# Patient Record
Sex: MALE | URBAN - METROPOLITAN AREA
[De-identification: names, ages, dates, MRNs, and addresses within clinical notes are randomized per-mention and may not be internally consistent; named-entity substitution may affect disease eponyms.]

---

## 2020-06-04 ENCOUNTER — HOSPITAL ENCOUNTER (OUTPATIENT)
Age: 40
Setting detail: SPECIMEN
Discharge: HOME | End: 2020-06-04
Payer: COMMERCIAL

## 2020-06-04 LAB — MICRO: NORMAL

## 2020-06-04 PROCEDURE — 89321 SEMEN ANAL SPERM DETECTION: CPT

## 2022-12-21 ENCOUNTER — HOSPITAL ENCOUNTER (EMERGENCY)
Age: 42
Discharge: HOME | End: 2022-12-21

## 2022-12-21 VITALS
HEIGHT: 72 IN | RESPIRATION RATE: 18 BRPM | BODY MASS INDEX: 28.17 KG/M2 | WEIGHT: 208 LBS | SYSTOLIC BLOOD PRESSURE: 120 MMHG | OXYGEN SATURATION: 99 % | TEMPERATURE: 98.3 F | HEART RATE: 56 BPM | DIASTOLIC BLOOD PRESSURE: 75 MMHG

## 2022-12-21 DIAGNOSIS — R45.851 SUICIDAL IDEATION: Primary | ICD-10-CM

## 2022-12-21 LAB
ALBUMIN SERPL-MCNC: 3.9 GM/DL (ref 3.4–5)
ALCOHOL SCREEN SERUM: <0.01 %WT/VOL
ALP BLD-CCNC: 63 IU/L (ref 40–129)
ALT SERPL-CCNC: 16 U/L (ref 10–40)
ANION GAP SERPL CALCULATED.3IONS-SCNC: 9 MMOL/L (ref 4–16)
AST SERPL-CCNC: 19 IU/L (ref 15–37)
BASOPHILS ABSOLUTE: 0.1 K/CU MM
BASOPHILS RELATIVE PERCENT: 0.7 % (ref 0–1)
BILIRUB SERPL-MCNC: 0.8 MG/DL (ref 0–1)
BILIRUBIN URINE: NEGATIVE MG/DL
BLOOD, URINE: NEGATIVE
BUN BLDV-MCNC: 9 MG/DL (ref 6–23)
CALCIUM SERPL-MCNC: 8.8 MG/DL (ref 8.3–10.6)
CHLORIDE BLD-SCNC: 103 MMOL/L (ref 99–110)
CLARITY: CLEAR
CO2: 24 MMOL/L (ref 21–32)
COLOR: YELLOW
COMMENT UA: NORMAL
CREAT SERPL-MCNC: 0.7 MG/DL (ref 0.9–1.3)
DIFFERENTIAL TYPE: ABNORMAL
EOSINOPHILS ABSOLUTE: 0.3 K/CU MM
EOSINOPHILS RELATIVE PERCENT: 2.8 % (ref 0–3)
GFR SERPL CREATININE-BSD FRML MDRD: >60 ML/MIN/1.73M2
GLUCOSE BLD-MCNC: 95 MG/DL (ref 70–99)
GLUCOSE, URINE: NEGATIVE MG/DL
HCT VFR BLD CALC: 42.9 % (ref 42–52)
HEMOGLOBIN: 14.6 GM/DL (ref 13.5–18)
IMMATURE NEUTROPHIL %: 0.2 % (ref 0–0.43)
KETONES, URINE: NEGATIVE MG/DL
LEUKOCYTE ESTERASE, URINE: NEGATIVE
LYMPHOCYTES ABSOLUTE: 1.8 K/CU MM
LYMPHOCYTES RELATIVE PERCENT: 17.8 % (ref 24–44)
MCH RBC QN AUTO: 31.3 PG (ref 27–31)
MCHC RBC AUTO-ENTMCNC: 34 % (ref 32–36)
MCV RBC AUTO: 91.9 FL (ref 78–100)
MONOCYTES ABSOLUTE: 0.7 K/CU MM
MONOCYTES RELATIVE PERCENT: 6.8 % (ref 0–4)
NITRITE URINE, QUANTITATIVE: NEGATIVE
NUCLEATED RBC %: 0 %
PDW BLD-RTO: 11.8 % (ref 11.7–14.9)
PH, URINE: 7 (ref 5–8)
PLATELET # BLD: 309 K/CU MM (ref 140–440)
PMV BLD AUTO: 10 FL (ref 7.5–11.1)
POTASSIUM SERPL-SCNC: 3.9 MMOL/L (ref 3.5–5.1)
PROTEIN UA: NEGATIVE MG/DL
RBC # BLD: 4.67 M/CU MM (ref 4.6–6.2)
SEGMENTED NEUTROPHILS ABSOLUTE COUNT: 7.3 K/CU MM
SEGMENTED NEUTROPHILS RELATIVE PERCENT: 71.7 % (ref 36–66)
SODIUM BLD-SCNC: 136 MMOL/L (ref 135–145)
SPECIFIC GRAVITY UA: 1.02 (ref 1–1.03)
TOTAL IMMATURE NEUTOROPHIL: 0.02 K/CU MM
TOTAL NUCLEATED RBC: 0 K/CU MM
TOTAL PROTEIN: 6.7 GM/DL (ref 6.4–8.2)
TOTAL RETICULOCYTE COUNT: 0.07 K/CU MM
UROBILINOGEN, URINE: 1 MG/DL (ref 0.2–1)
WBC # BLD: 10.2 K/CU MM (ref 4–10.5)

## 2022-12-21 PROCEDURE — 85025 COMPLETE CBC W/AUTO DIFF WBC: CPT

## 2022-12-21 PROCEDURE — 80053 COMPREHEN METABOLIC PANEL: CPT

## 2022-12-21 PROCEDURE — 99285 EMERGENCY DEPT VISIT HI MDM: CPT

## 2022-12-21 PROCEDURE — 81003 URINALYSIS AUTO W/O SCOPE: CPT

## 2022-12-21 PROCEDURE — G0480 DRUG TEST DEF 1-7 CLASSES: HCPCS

## 2022-12-21 ASSESSMENT — PAIN - FUNCTIONAL ASSESSMENT
PAIN_FUNCTIONAL_ASSESSMENT: NONE - DENIES PAIN
PAIN_FUNCTIONAL_ASSESSMENT: NONE - DENIES PAIN

## 2022-12-21 NOTE — ED NOTES
Patient given copy of safety plan and a list of [place] [address] resources as well as emergency contact numbers.      KACEY Alva  12/21/22 1826

## 2022-12-21 NOTE — CONSULTS
Psychiatric Consult  Isaura Vasquez  [id number]  12/21/2022 12/21/22            ID: Patient is a 43 y.o. male     CC: I was stressed but better now     HPI:  Pt is a 44 yo  male who presents for exacerbation of adjustment d/O with depressed mood. Pt noted recent exacarbation of mood but feels safe on his current medications. Pt noted he currently feels safe and comfortable on the unit. Pt was in agreement with treatment team.  Pt was polite and cordial during the interview process. Pt throughout his stay at [facility] pt felt like his medications were working and  felt comfortable being discharged on these medications. Pt was advised to take all medications as prescribed, follow up with all scheduled appointments and abstain from any alcohol or illicit substances. Pt was in agreement. Pt felt safe and comfortable to be discharge and to follow up with outpt mental health. Pt was very optimistic about his D/C and returning to his home. Pt stated that he  was doing \"good,\" today. Pt stated that he slept \"about 6 hours,\" last night. Pt stated that his appetite is \"good. \"  Pt stated that he rates his depression a  \"0,\" on a scale of 0-10 with 10 being the worst and 0 being none. Pt stated  that he rates his anxiety an \"1/10,\" on the same scale. Pt denies any auditory  or visual hallucinations. Pt denied any thoughts to harm himself or anyone else. Pt felt safe and comfortable for D/C. Pt denies any access to guns or weapons. The Pt was educated primarily by verbal means about his diagnoses and their manifestations in his life. The option for treatment including group individual therapy programming was offered to him and the use of medications with all their  potential risks, benefits, and side-effects were discussed with the pt at length. Pt was given the opportunity to ask questions and he participated in the treatment and planning process.  Pt felt ready and eager to be discharged from the from [facility]. Pt felt he was safe for this disposition. Pt was considered to be able to participate in informed consent and decision-making with respect to medical,  legal and financial issues at the time of his discharge from [facility]. Pt denied any hx of seizures, TBIs, Hep C or HIV  No TD noted, AIMS=0,      Pt noted hx of previous inpt psychiatric admissions  Pt denied any previous suicide attempt   Pt denied any family hx of suicides  Pt denied any family mental health hx     Pt denied any hx of abuse trauma or neglect, physical sexual or emotional.       Alcohol: noted social use only once per month  Street drugs: denies any current  Tobacco: denies any current  Caffeine: 2-3 per day      Past Psychiatric History:   See note above       Family Psychiatric History:   History reviewed. No pertinent family history. Allergies:  No Known Allergies     OBJECTIVE  Vital Signs: There were no vitals filed for this visit.     Labs:  Recent Results (from the past 48 hour(s))   CBC with Auto Differential    Collection Time: 12/21/22  3:02 PM   Result Value Ref Range    WBC 10.2 4.0 - 10.5 K/CU MM    RBC 4.67 4.6 - 6.2 M/CU MM    Hemoglobin 14.6 13.5 - 18.0 GM/DL    Hematocrit 42.9 42 - 52 %    MCV 91.9 78 - 100 FL    MCH 31.3 (H) 27 - 31 PG    MCHC 34.0 32.0 - 36.0 %    RDW 11.8 11.7 - 14.9 %    Platelets 031 710 - 288 K/CU MM    MPV 10.0 7.5 - 11.1 FL    Differential Type AUTOMATED DIFFERENTIAL     Segs Relative 71.7 (H) 36 - 66 %    Lymphocytes % 17.8 (L) 24 - 44 %    Monocytes % 6.8 (H) 0 - 4 %    Eosinophils % 2.8 0 - 3 %    Basophils % 0.7 0 - 1 %    Segs Absolute 7.3 K/CU MM    Lymphocytes Absolute 1.8 K/CU MM    Monocytes Absolute 0.7 K/CU MM    Eosinophils Absolute 0.3 K/CU MM    Basophils Absolute 0.1 K/CU MM    Nucleated RBC % 0.0 %    Total Nucleated RBC 0.0 K/CU MM    TRC 0.0691 K/CU MM    Total Immature Neutrophil 0.02 K/CU MM    Immature Neutrophil % 0.2 0 - 0.43 %   Comprehensive Metabolic Panel w/ Reflex to [facility]    Collection Time: 12/21/22  3:02 PM   Result Value Ref Range    Sodium 136 135 - 145 MMOL/L    Potassium 3.9 3.5 - 5.1 MMOL/L    Chloride 103 99 - 110 mMol/L    CO2 24 21 - 32 MMOL/L    BUN 9 6 - 23 MG/DL    Creatinine 0.7 (L) 0.9 - 1.3 MG/DL    Est, Glom Filt Rate >60 >60 mL/min/1.73m2    Glucose 95 70 - 99 MG/DL    Calcium 8.8 8.3 - 10.6 MG/DL    Albumin 3.9 3.4 - 5.0 GM/DL    Total Protein 6.7 6.4 - 8.2 GM/DL    Total Bilirubin 0.8 0.0 - 1.0 MG/DL    ALT 16 10 - 40 U/L    AST 19 15 - 37 IU/L    Alkaline Phosphatase 63 40 - 129 IU/L    Anion Gap 9 4 - 16   Ethanol    Collection Time: 12/21/22  3:02 PM   Result Value Ref Range    Alcohol Scrn <0.01 <0.01 %WT/VOL       Review of Systems:  Reports of no current cardiovascular, respiratory, gastrointestinal, genitourinary, integumentary, neurological, muscuoskeletal, or immunological symptoms today. PSYCHIATRIC: See HPI above. Review of Systems:  Reports of no current cardiovascular, respiratory, gastrointestinal, genitourinary, integumentary, neurological, muscuoskeletal, or immunological symptoms today. PSYCHIATRIC: See HPI above. Neurologic examination:  Mental status: The patient is alert, attentive, and oriented. Speech is clear and fluent with good repetition, comprehension, and naming.            PSYCHIATRIC EXAMINATION / MENTAL STATUS EXAM                     General appearance: [x] appears age, []  appears older than stated age,                                     [x]  adequately dressed and groomed, [] disheveled,                                     [x]  in no acute distress, [] appears mildly distressed, [] other                                                                     MUSCULOSKELETAL:            Gait:                             [] normal, [] antalgic, [] unsteady, [x] gait not evaluated   Station:                        [] erect, [] sitting, [x] recumbent, [] other                             Strength/tone:             [x] muscle strength and tone appear consistent with age and                                        condition                                      [] atrophy                                       [] abnormal movements  PSYCHIATRIC:    Appearance: appears stated age. alert and oriented to person, place, time & situation. no acute distress. Adequate grooming and hygeine. Good eye contact. No prominent physical abnormalities. Attitude: Manner is cooperative and pleasant  Motor: No psychomotor agitation, retardation or abnormal movements noted  Speech: Clearly articulated; normal rate, volume, tone & amount. Language: intact understanding and production  Mood: okay  Affect: euthymic, full range, non-labile, congruent with mood and content of speech  Thought Production: Spontaneous. Thought Form: Coherent, linear, logical & goal-directed. No tangentiality or circumstantiality. No flight of ideas or loosening of associations. Thought Content/Perceptions: No JUSTINE, no AVH, no delusion  Insight: fair  Judgment fair  Memory: Immediate, recent, and remote appear intact, though not formally tested. Attention: maintained throughout interview  Fund of knowledge: Average  Gait/Balance: WNL/WNL                                              Impression:   Adjustment D/O with depressed mood     Problem List:   <principal problem not specified>    Pt does not require admission to inpt psychiatry at this time, pt to follow up with out pt mental health upon discharge once medically cleared. Plan:  1. Reviewed treatment plan with patient including medication risks, benefits, side effects. Obtained informed consent for treatment. 2. Psychiatric management:medication initiation and titration, recommend outpt mental health follow up, safe and theraputic environment. 3. Status of problem/condition: ?Improving  4. Medical co-morbidities: [facility], appreciate assistance  5. Legal Status: voluntary  6.  The treatment team reviewed with the patient the diagnosis and treatment recommendations to include the risks, benefits, and side effects of chosen medications. 7. The patient verbalized understanding and agreed with the treatment regimen as outlined above. 8. Medical records, Labs, Diagnotic tests reviewed  9. Interval History. 10. Review current labs  11. Continue current medications  12. Supportive Therapy Provided  13. Pt had an opportunity to ask questions and address concerns  14. Pt encouraged to continue outpt  Therapy. 15. Pt was in agreement with treatment plan. 16. The risks benefits and side effects of medications were discussed with the patient, including alternatives and no treatment.

## 2022-12-21 NOTE — ED NOTES
Chief Complaint:    SI      Provisional Diagnosis:   Probable depression   Probable anxiety      Risk, Psychosocial and Contextual Factors: Patient has a lot of stressors. He recently had his Fother -in- law pass away on Nov. 18. He also states he has been fighting a lot with his wife. He states his job is stressful. He also has 7 children, varying in age from 7-21 years old. Current MH Treatment: Denies any        Present Suicidal Behavior:    Verbal: Patient denies that he is currently suicidal    Attempt: No actual attempt      Access to Weapons: Denies access to guns      C-SSRS Current Suicide Risk: Low, Moderate or High:        HIGH Risk    Past Suicidal Behavior:    Verbal: Patient denies past SI    Attempts: No attempts      Self-Injurious/Self-Mutilation: Denies      Traumatic Event Within Past 2 Weeks: Father-in-law passed away unexpectedly. Patient has been arguing with wife a lot. Patient states he has a lot of stress at work. Current Abuse:  Denies      Legal: Denies      Violence: Not violent      Protective Factors:        Housing: Lives with wife and kids visit every other weekend    Clinical Summary:  Patient brought to ED by  after he got into a fight and threatened to shoot himself. Patient denies he actually has access to guns.  verified this. Patient states he has a lot of stressors including relationship problems with his wife. Patient also recently had his father-in law pass away unexpectedly on Nov. 18. Patient states he works a stressful job and also works a part time position at [employer]. Patient has 7 children and is also in the middle of a custody zarate. Patient states he was in the [place] of the moment\", when he threatened suicide to wife. He currently denies SI, plan or intent. Patient denies HI. Patient denies AVH. Patient denies any hx of MH issues, outpatient or inpatient. Patient states he sleeps 4-5 hrs. per night. Patient states appetite is good. Patient denies any hx of abuse. Patient denies any illicit drug use. Patient states he drinks occasionally. Patient voices interest in follow up [facility] services. Level of Care Disposition:      Consulted with medical provider. Patient is medically stabilized. Consulted with patients RN about abnormalities or medical concerns. No abnormalities or medical concerns noted. Consulted with__Colby Basurto, APRN-CNP and Dr. Marisol Mccall, psychiatrist__Patient to be further assessed by psychiatrist for recommendations for patient.         KACEY Coughlin  12/21/22 7770

## 2022-12-21 NOTE — ED TRIAGE NOTES
Pt presents to the ED by [facility]. Pt was in a heated argument with his wife and said he was going to use a gun to kill himself. Pt verbalized hopelessness and states his job is stressful, he feels his marriage is failing and his KEI passed away in November.

## 2022-12-21 NOTE — DISCHARGE INSTRUCTIONS
Follow-up with the mental health resources that were provided. Follow-up with a primary care provider in a week, call to schedule an appointment. Return to the emergency department or call 911 with any thoughts of harming yourself or others or worsening symptoms.

## 2022-12-21 NOTE — ED PROVIDER NOTES
EMERGENCY DEPARTMENT ENCOUNTER      PCP: No primary care provider on file. CHIEF COMPLAINT    Chief Complaint   Patient presents with    Suicidal             HPI    Deny Lamas is a 43 y.o. male who presents with police for suicidal ideations. The patient states 2022 has not been his yard.  has been under an increased amount of stress with feeling like his relationship is failing and a custody zarate with his ex-wife. He states today he and his significant other and got an argument and he made statements that he would be around in 2023 and that he would shoot himself. He states he is not actively having thoughts of self-harm. He states he does sometimes feel like he would like to move away and start over. He denies a history of mental health diagnoses. It has alleviated or exacerbated his symptoms. He denies any physical complaints at this time. He was placed on pink slip by law enforcement. REVIEW OF SYSTEMS    Constitutional:  Denies fever, chills, weight loss or weakness   HENT:  Denies sore throat or ear pain   Cardiovascular:  Denies chest pain, palpitations   Respiratory:  Denies cough or shortness of breath    GI:  Denies abdominal pain, nausea, vomiting, or diarrhea  :  Denies any urinary symptoms Musculoskeletal:  Denies back pain  Skin:  Denies rash  Neurologic:  Denies headache, focal weakness or sensory changes   Endocrine:  Denies polyuria or polydypsia   Lymphatic:  Denies swollen glands     All other review of systems are negative  See HPI and nursing notes for additional information     PAST MEDICAL AND SURGICAL HISTORY    History reviewed. No pertinent past medical history. History reviewed. No pertinent surgical history.     CURRENT MEDICATIONS        ALLERGIES    No Known Allergies    SOCIAL AND FAMILY HISTORY    Social History     Socioeconomic History    Marital status: Unknown     Spouse name: None    Number of children: None    Years of education: None    Highest education level: None   Tobacco Use    Smoking status: Never    Smokeless tobacco: Never   Substance and Sexual Activity    Drug use: Never    Sexual activity: Yes     Partners: Female     History reviewed. No pertinent family history. PHYSICAL EXAM    VITAL SIGNS: /75   Pulse 56   Temp 98.3 °F (36.8 °C) (Oral)   Resp 18   Ht 6' (1.829 m)   Wt 208 lb (94.3 kg)   SpO2 99%   BMI 28.21 kg/m²    Constitutional:  Well developed, Well nourished. No distress  HENT:  Normocephalic, Atraumatic, PERRL. EOMI. Sclera clear. Conjunctiva normal, No discharge. Neck/Lymphatics: supple, no JVD, no swollen nodes  Cardiovascular:   RRR,  no murmurs/rubs/gallops. Respiratory:  Nonlabored breathing. Normal breath sounds, No wheezing  Abdomen: Bowel sounds normal, Soft, No tenderness, no masses. Musculoskeletal:    Bilateral upper and lower extremity ROM intact without pain or obvious deficit  Integument:  Warm, Dry  Neurologic: Alert & oriented , No focal deficits noted. Cranial nerves II through XII grossly intact. Normal gross motor coordination & motor strength bilateral upper and lower extremities  Sensation intact.   Psychiatric:  Affect normal, Mood normal.       Labs:  Results for orders placed or performed during the hospital encounter of 12/21/22   CBC with Auto Differential   Result Value Ref Range    WBC 10.2 4.0 - 10.5 K/CU MM    RBC 4.67 4.6 - 6.2 M/CU MM    Hemoglobin 14.6 13.5 - 18.0 GM/DL    Hematocrit 42.9 42 - 52 %    MCV 91.9 78 - 100 FL    MCH 31.3 (H) 27 - 31 PG    MCHC 34.0 32.0 - 36.0 %    RDW 11.8 11.7 - 14.9 %    Platelets 934 617 - 563 K/CU MM    MPV 10.0 7.5 - 11.1 FL    Differential Type AUTOMATED DIFFERENTIAL     Segs Relative 71.7 (H) 36 - 66 %    Lymphocytes % 17.8 (L) 24 - 44 %    Monocytes % 6.8 (H) 0 - 4 %    Eosinophils % 2.8 0 - 3 %    Basophils % 0.7 0 - 1 %    Segs Absolute 7.3 K/CU MM    Lymphocytes Absolute 1.8 K/CU MM    Monocytes Absolute 0.7 K/CU MM    Eosinophils Absolute 0.3 K/CU MM    Basophils Absolute 0.1 K/CU MM    Nucleated RBC % 0.0 %    Total Nucleated RBC 0.0 K/CU MM    TRC 0.0691 K/CU MM    Total Immature Neutrophil 0.02 K/CU MM    Immature Neutrophil % 0.2 0 - 0.43 %   Comprehensive Metabolic Panel w/ Reflex to MG   Result Value Ref Range    Sodium 136 135 - 145 MMOL/L    Potassium 3.9 3.5 - 5.1 MMOL/L    Chloride 103 99 - 110 mMol/L    CO2 24 21 - 32 MMOL/L    BUN 9 6 - 23 MG/DL    Creatinine 0.7 (L) 0.9 - 1.3 MG/DL    Est, Glom Filt Rate >60 >60 mL/min/1.73m2    Glucose 95 70 - 99 MG/DL    Calcium 8.8 8.3 - 10.6 MG/DL    Albumin 3.9 3.4 - 5.0 GM/DL    Total Protein 6.7 6.4 - 8.2 GM/DL    Total Bilirubin 0.8 0.0 - 1.0 MG/DL    ALT 16 10 - 40 U/L    AST 19 15 - 37 IU/L    Alkaline Phosphatase 63 40 - 129 IU/L    Anion Gap 9 4 - 16   Ethanol   Result Value Ref Range    Alcohol Scrn <0.01 <0.01 %WT/VOL   Urinalysis   Result Value Ref Range    Color, UA YELLOW YELLOW    Clarity, UA CLEAR CLEAR    Glucose, Urine NEGATIVE NEGATIVE MG/DL    Bilirubin Urine NEGATIVE NEGATIVE MG/DL    Ketones, Urine NEGATIVE NEGATIVE MG/DL    Specific Gravity, UA 1.020 1.001 - 1.035    Blood, Urine NEGATIVE NEGATIVE    pH, Urine 7.0 5.0 - 8.0    Protein, UA NEGATIVE NEGATIVE MG/DL    Urobilinogen, Urine 1.0 0.2 - 1.0 MG/DL    Nitrite Urine, Quantitative NEGATIVE NEGATIVE    Leukocyte Esterase, Urine NEGATIVE NEGATIVE    Urinalysis Comments       Microscopic exam not performed based on chemical results unless requested in original order. ED COURSE & MEDICAL DECISION MAKING       55-year-old male presents emergency department on a pink slip with law enforcement for suicidal ideations. The patient and his wife got an argument he had made comments that he would not be here in 2023 and would shoot himself. There are no guns in the house. Patient denied any active suicidal thoughts at this time. States he was angry and just said some stuff.   If he does sometimes feel like he wants to move away and start over. Suicide precautions were placed and a sitter to bedside. Labs obtained. Mental health consult placed. CBC within normal limits. Hemoglobin and hematocrit normal.  CMP did not show any severe electrolyte derangement. Alcohol is negative. Urinalysis negative. Dr. Tyrell Peña evaluated the patient and feels he is safe for discharge at this time. They did make a safety plan and he was provided health resources to follow-up with. Patient again denies any suicidal ideation to me at this time. Instructed to follow-up with his primary care provider and mental health resources within a week and return here with any thoughts of harming himself or others. He verbalized understanding and agrees with plan of care      Patient agrees to return emergency department if symptoms worsen or any new symptoms develop. Vital signs and nursing notes reviewed during ED course. DDx: Infectious process, electrolyte derangement, alcohol intoxication, or other emergent condition. Clinical  IMPRESSION    1. Suicidal ideation              Comment: Please note this report has been produced using speech recognition software and may contain errors related to that system including errors in grammar, punctuation, and spelling, as well as words and phrases that may be inappropriate. If there are any questions or concerns please feel free to contact the dictating provider for clarification.       Matty Granados, RERE - CNP  12/21/22 1214

## 2022-12-21 NOTE — SUICIDE SAFETY PLAN
SAFETY PLAN    A suicide Safety Plan is a document that supports someone when they are having thoughts of suicide. Warning Signs that indicate a suicidal crisis may be developing: What (situations, thoughts, feelings, body sensations, behaviors, etc.) do you experience that lets you know you are beginning to think about suicide? 1. Getting agitated  2. Feeling out of control  3. Feeling anxious and overwhelmed    Internal Coping Strategies:  What things can I do (relaxation techniques, hobbies, physical activities, etc.) to take my mind off my problems without contacting another person? 1. Listen to music  2. Enjoys hockey  3. Getting away from stressors by taking a walk or going to a quiet room. People and social settings that provide distraction: Who can I call or where can I go to distract me? 1. Name: Friends  Phone: Programmed into phone   2. Place: Going to [place]            4. Place: going for a walk or drive. People whom I can ask for help: Who can I call when I need help - for example, friends, family, clergy, someone else? 1. Name: Wife                Phone: Programmed into phone  2. Name: friends  Phone: programmed into phone  3. Name: marriage counselor  Phone: programmed into phone    Professionals or [facility] agencies I can contact during a crisis: Who can I call for help - for example, my doctor, my psychiatrist, my psychologist, a mental health provider, a suicide hotline? 1. Clinician Name: [facility] services [facility]   Phone: [phone]    2. Clinician Name: [facility]  ER   Phone: [phone]    3. Suicide Prevention Lifeline: 4-810-693-TALK (1614)    4. [address] [place] [address] Emergency Services -  for example, [facility] suicide hotline, [facility] Hotline: Suicide and crisis hotline          Emergency Services Phone: call or text [phone]   or chat : [email or web address]    Making the environment safe:  How can I make my environment (house/apartment/living space) safer? For example, can I remove guns, medications, and other items? 1. Remove any household items that could be a danger of self harm  2.  Remove any unused medications from home